# Patient Record
Sex: FEMALE | Race: BLACK OR AFRICAN AMERICAN
[De-identification: names, ages, dates, MRNs, and addresses within clinical notes are randomized per-mention and may not be internally consistent; named-entity substitution may affect disease eponyms.]

---

## 2017-09-27 ENCOUNTER — HOSPITAL ENCOUNTER (OUTPATIENT)
Dept: HOSPITAL 62 - WI | Age: 37
End: 2017-09-27
Attending: ADVANCED PRACTICE MIDWIFE
Payer: MEDICAID

## 2017-09-27 DIAGNOSIS — Z80.3: ICD-10-CM

## 2017-09-27 DIAGNOSIS — Z12.31: Primary | ICD-10-CM

## 2017-09-27 PROCEDURE — 77067 SCR MAMMO BI INCL CAD: CPT

## 2017-09-27 PROCEDURE — G0202 SCR MAMMO BI INCL CAD: HCPCS

## 2017-09-27 NOTE — WOMENS IMAGING REPORT
EXAM DESCRIPTION:  BILAT SCREENING MAMMO W/CAD



COMPLETED DATE/TIME:  9/27/2017 10:47 am



REASON FOR STUDY:  SCREENING MAMMO Z12.31  ENCNTR SCREEN MAMMOGRAM FOR MALIGNANT NEOPLASM OF FELICITAS



COMPARISON:  Multiple since 2011



TECHNIQUE:  Standard craniocaudal and mediolateral oblique views of each breast recorded using digita
l acquisition.



LIMITATIONS:  None.



FINDINGS:  No masses, calcifications or architectural distortion. No areas of suspicion.

Read with the assistance of CAD.

.[facility] - R2 Cenova Version 1.3

.[facility] - R2 Cenova Version 1.3

.[facility] - R2 Cenova Version 2.4

.[facility] [place]2 Cenova Version 2.4

.[facility] - R2  Version 9.2



IMPRESSION:  NORMAL MAMMOGRAM.  BIRADS 1.



BREAST DENSITY:  c. The breasts are heterogeneously dense, which may obscure small masses.



BIRAD:  1 NEGATIVE



RECOMMENDATION:  ROUTINE SCREENING



COMMENT:  The patient has been notified of the results by letter per SA requirements. Additional no
tification policies are in place for contacting patient with suspicious or incomplete findings.

Quality ID #225: The American College of Radiology recommends an annual screening mammogram for women
 aged 40 years or over. This facility utilizes a reminder system to ensure that all patients receive 
reminder letters, and/or direct phone calls for appointments. This includes reminders for routine scr
eening mammograms, diagnostic mammograms, or other Breast Imaging Interventions when appropriate.  Th
is patient will be placed in the appropriate reminder system.

The American College of Radiology (ACR) has developed recommendations for screening MRI of the breast
s in certain patient populations, to be used in conjunction with mammography.  Breast MRI surveillanc
e may be appropriate for women with more than 20% lifetime risk of developing breast cancer  as deter
mined by genetic testing, significant family history of the disease, or history of mantle radiation f
or Hodgkins Disease.  ACR Practice Guidelines 2008.



TECHNICAL DOCUMENTATION:  FINDING NUMBER: (1)

ASSESSMENT: (1)

JOB ID:  [id number]

 2011 [employer]- All Rights Reserved

## 2018-12-17 ENCOUNTER — HOSPITAL ENCOUNTER (EMERGENCY)
Dept: HOSPITAL 62 - ER | Age: 38
Discharge: HOME | End: 2018-12-17
Payer: MEDICAID

## 2018-12-17 VITALS — DIASTOLIC BLOOD PRESSURE: 64 MMHG | SYSTOLIC BLOOD PRESSURE: 123 MMHG

## 2018-12-17 DIAGNOSIS — M77.31: Primary | ICD-10-CM

## 2018-12-17 DIAGNOSIS — J45.909: ICD-10-CM

## 2018-12-17 DIAGNOSIS — Z87.891: ICD-10-CM

## 2018-12-17 DIAGNOSIS — M79.671: ICD-10-CM

## 2018-12-17 PROCEDURE — 99283 EMERGENCY DEPT VISIT LOW MDM: CPT

## 2018-12-17 NOTE — ER DOCUMENT REPORT
ED Extremity Problem, Lower





- General


Chief Complaint: Foot Pain


Stated Complaint: FOOT PAIN


Time Seen by Provider: 12/17/18 11:34


Mode of Arrival: Ambulatory


Information source: Patient


Notes: 





38-year-old female presented to ED for complaint of pain to her right foot with 

swelling.  She states she did a 5K March on Saturday and then had her daughter'

s birthday on Sunday and now is very painful to walk foot.  Patient states she 

has not had any injuries.  Patient states she always has some swelling to the 

right foot is just more swollen today than normal.  She states it is been very 

painful to walk or step on this foot.  Patient is alert and oriented 

respirations regular and unlabored speaking in full sentences.


TRAVEL OUTSIDE OF THE [place] IN LAST 30 DAYS: No





- HPI


Patient complains to provider of: Pain, Swelling


Location: Foot


Occurred: Yesterday - Right


Where: Other - See HPI


Onset/Duration: Gradual


Quality of pain: Sharp


Severity: Moderate


Pain Level: 4


Context: Other


Recent injury: No


Associated symptoms: Painful ambulation


Exacerbated by: Movement, Walking


Relieved by: Nothing





- Related Data


Allergies/Adverse Reactions: 


 





No Known Allergies Allergy (Verified 12/17/18 11:19)


 











Past Medical History





- General


Information source: Patient





- Social History


Smoking Status: Former Smoker


Chew tobacco use (# tins/day): No


Frequency of alcohol use: Social - 2-3 times a week


Drug Abuse: None


Lives with: Family


Family History: DM, Hyperlipidemia, Hypertension, Malignancy, Thyroid 

Disfunction, Other - cushings, pituitary tumor.


Patient has suicidal ideation: No


Patient has homicidal ideation: No





- Past Medical History


Cardiac Medical History: Reports: None


Pulmonary Medical History: Reports: Hx Asthma


EENT Medical History: Reports: None


Neurological Medical History: Reports: None


Endocrine Medical History: Reports: Hx Hypothyroidism


Renal/ Medical History: Reports: None


Malignancy Medical History: Reports: None


GI Medical History: Reports: None


Musculoskeletal Medical History: Reports Hx Arthritis, Reports Hx 

Musculoskeletal Deformity


Skin Medical History: Reports Hx Cellulitis - breast abscess


Psychiatric Medical History: Reports: None


Traumatic Medical History: Reports: None


Infectious Medical History: Reports: None


Past Surgical History: Reports: Hx Cholecystectomy, Hx Orthopedic Surgery - R 

hip replacement bilaterally, Hx Thyroid Surgery - thyroidectomy





- Immunizations


Immunizations up to date: No


Hx Diphtheria, Pertussis, Tetanus Vaccination: No





Review of Systems





- Review of Systems


Constitutional: No symptoms reported


EENT: No symptoms reported


Cardiovascular: No symptoms reported


Respiratory: No symptoms reported


Gastrointestinal: No symptoms reported


Genitourinary: No symptoms reported


Female Genitourinary: No symptoms reported


Musculoskeletal: Other - Painful swollen right foot ran a 5K on Saturday, stood 

all day yesterday for a birthday party, history of swollen foot, much worse 

today


Skin: No symptoms reported


Hematologic/Lymphatic: No symptoms reported


Neurological/Psychological: No symptoms reported


-: Yes All other systems reviewed and negative





Physical Exam





- Vital signs


Vitals: 


 











Temp Pulse Resp BP Pulse Ox


 


 98.5 F   93   16   123/64   98 


 


 12/17/18 11:27  12/17/18 11:27  12/17/18 11:27  12/17/18 11:27  12/17/18 11:27











Interpretation: Normal





- General


General appearance: Appears well, Alert





- HEENT


Head: Normocephalic, Atraumatic


Eyes: Normal


Pupils: PERRL





- Respiratory


Respiratory status: No respiratory distress


Chest status: Nontender


Breath sounds: Normal


Chest palpation: Normal





- Cardiovascular


Rhythm: Regular


Heart sounds: Normal auscultation


Murmur: No





- Abdominal


Inspection: Normal


Distension: No distension


Bowel sounds: Normal


Tenderness: Nontender


Organomegaly: No organomegaly





- Back


Back: Normal, Nontender





- Extremities


General upper extremity: Normal inspection, Nontender, Normal color, Normal ROM

, Normal temperature


General lower extremity: Normal color, Normal ROM, Normal temperature.  No: 

Jamil's sign


Foot: Tender, Edema, Metatarsal compress. pain, No evidence of FB





- Neurological


Neuro grossly intact: Yes


Cognition: Normal


Orientation: AAOx4


[place] Coma Scale Eye Opening: Spontaneous


[place] Coma Scale Verbal: Oriented


[place] Coma Scale Motor: Obeys Commands


[place] Coma Scale Total: 15


Speech: Normal


Motor strength normal: LUE, RUE, LLE, RLE


Sensory: Normal





- Psychological


Associated symptoms: Normal affect, Normal mood





- Skin


Skin Temperature: Warm


Skin Moisture: Dry


Skin Color: Normal





Course





- Re-evaluation


Re-evalutation: 





12/17/18 20:46


Discussed x-ray with patient and written report of x-ray given to patient for 

follow-up with primary doctor.  Patient was offered crutches and provided with 

crutches for her pain.  Patient was discharged home with instructions to follow-

up with primary doctor and with podiatry.  Patient was discharged home.  

Patient does have heel spurs.  Patient was given instructions on elevation ice 

and foot exercises.





- Vital Signs


Vital signs: 


 











Temp Pulse Resp BP Pulse Ox


 


 98.5 F   93   16   123/64   98 


 


 12/17/18 11:27  12/17/18 11:27  12/17/18 11:27  12/17/18 11:27  12/17/18 11:27














- Diagnostic Test


Radiology reviewed: Image reviewed, Reports reviewed





Procedures





- Immobilization


  ** Right Foot


Time completed: 13:20


Pre-Proc Neuro Vasc Exam: Normal


Immobilizer type: Crutches


Performed by: RN


Post-Proc Neuro Vasc Exam: Normal


Alignment checked and good: Yes





Discharge





- Discharge


Clinical Impression: 


Heel spur


Qualifiers:


 Laterality: right Qualified Code(s): M77.31 - Calcaneal spur, right foot





Condition: Stable


Disposition: HOME, SELF-CARE


Instructions:  Plantar Fasciitis or Heel Spur ([facility])


Additional Instructions: 


Plantar Fasciitis or Heel Spur





     Plantar fasciitis is an inflammation of a ligament on the underside of the 

foot.  It can be caused by injury, overuse such as running, or poorly fitting 

shoes.  There may be a bone spur on the heel if inflammation has persisted a 

long time.


     Plantar fasciitis is treated with stretching exercises and 

antiinflammatory medicine.  More severe cases may require injection of 

cortisone.  It may take several weeks to get better.  If nothing gives relief, 

an operation to remove the heel spur may help.


     Call or return if there is redness, increasing pain, swelling, fever, or 

any other new symptoms.





Exercises for the Foot Muscles





    Stretching and strengthening of the foot muscles is an important part of 

recovery from injury, as well as in treatment and prevention of overuse 

syndromes like plantar fasciitis.


     TOWEL CURLS:  Put your foot on a dry towel.  Curl your toes to pick it up, 

then drop it.  As it becomes easier, use a heavier towel. Repeat 20 times, 

twice daily.


     SHIN CURLS:  Lift and turn your knee, so your foot is against the opposite 

leg about mid-shin.  Try to "grab" the entire shin bone with your toes, while 

moving your foot up and down the leg for one minute. Repeat twice daily.


     TOE LIFTS:  Put your opposite foot over your 2nd to 5th toes.  Now lift 

the toes up, pushing the other foot upward.  Repeat 10 times, twice daily.  

Repeat using the large toe.


     EVERSIONS:  Cross the opposite foot over, placing the heel just behind the 

4th and 5th toes.  Try to lift up the outside of the bottom foot.  Hold 10 

seconds.  Repeat twice daily.








Ice & Elevation





     Apply ice packs frequently against the painful area.  Many different 

schedules are recommended, such as "20 minutes on, 20 minutes off" or "one hour 

ice, two hours rest."  If you need to work, you may need to go longer between 

ice treatments.  You should plan to have the area ice packed AT LEAST one-

fourth of the time.


     The ice should be applied over the wrap, tape, or splint, or over a layer 

of cloth -- not directly against the skin.  Some ice bags have a built-in cloth 

and can be put directly on the skin.


     Your injured part should be elevated as much as possible over the next 48 

hours.  Try to keep the injury above the level of the heart. Avoid use of the 

injured area.  Elevation and rest will decrease the swelling.





Ibuprofen





     Ibuprofen is an excellent, safe drug for pain control.  In addition, it 

has potent antiinflammatory effects which are beneficial, especially in the 

treatment of injuries, arthritis, or tendonitis. It's best to take ibuprofen 

with food.  Persons with ulcer disease or allergy to aspirin should notify 

their physician of this before taking ibuprofen.


     Take the medication exactly as prescribed.  Don't take additional doses 

unless instructed to do so by your doctor.  If you develop wheezing, shortness 

of breath, hives, faintness, stomach pain, vomiting, or dark black stools, 

return for re-evaluation at once.





FOLLOW-UP CARE:


If you have been referred to a physician for follow-up care, call the physician

s office for an appointment as you were instructed or within the next two days.

  If you experience worsening or a significant change in your symptoms, notify 

the physician immediately or return to the Emergency Department at any time for 

re-evaluation.


Prescriptions: 


Ibuprofen [Motrin 800 mg Tablet] 800 mg PO Q8HP PRN #30 tab


 PRN Reason: 


Forms:  Return to Work


Referrals: 


MARGARET HENRY DPM [ACTIVE STAFF] - Follow up as needed

## 2018-12-17 NOTE — RADIOLOGY REPORT (SQ)
EXAM DESCRIPTION:  FOOT RIGHT COMPLETE



COMPLETED DATE/TIME:  12/17/2018 12:04 pm



REASON FOR STUDY:  pain swelling



COMPARISON:  None.



NUMBER OF VIEWS:  Three views.



TECHNIQUE:  AP, lateral and oblique  radiographic images acquired of the right foot.



LIMITATIONS:  None.



FINDINGS:  MINERALIZATION: Normal.

BONES: No acute fracture or dislocation.  No worrisome bone lesions.

JOINTS: Talonavicular dorsal osteophytes.

SOFT TISSUES: No soft tissue swelling.  No foreign body.

OTHER: No other significant finding.



IMPRESSION:  No acute findings.  Talonavicular dorsal osteophytes.



TECHNICAL DOCUMENTATION:  JOB ID:  [id number]

 2011 [employer]- All Rights Reserved



Reading location - IP/workstation name: [email or web address]

## 2018-12-27 ENCOUNTER — HOSPITAL ENCOUNTER (OUTPATIENT)
Dept: HOSPITAL 62 - WI | Age: 38
End: 2018-12-27
Attending: ADVANCED PRACTICE MIDWIFE
Payer: MEDICAID

## 2018-12-27 DIAGNOSIS — Z12.31: Primary | ICD-10-CM

## 2018-12-27 DIAGNOSIS — Z80.3: ICD-10-CM

## 2018-12-27 PROCEDURE — 77063 BREAST TOMOSYNTHESIS BI: CPT

## 2018-12-27 PROCEDURE — 77067 SCR MAMMO BI INCL CAD: CPT

## 2018-12-27 NOTE — WOMENS IMAGING REPORT
EXAM DESCRIPTION:  3D SCREENING MAMMO BILAT



COMPLETED DATE/TIME:  12/27/2018 11:27 am



REASON FOR STUDY:  SCREENING MAMMO Z12.31  ENCNTR SCREEN MAMMOGRAM FOR MALIGNANT NEOPLASM OF FELICITAS



COMPARISON:  [id number]



TECHNIQUE:  Standard craniocaudal and mediolateral oblique views of each breast recorded using digita
l acquisition and breast tomosynthesis.



LIMITATIONS:  None.



FINDINGS:  No masses, calcifications or architectural distortion. No areas of suspicion.

Read with the assistance of CAD.

.[facility] - R2 Cenova Version 1.3

.[facility] - R2 Cenova Version 1.3

.[facility] - R2 Cenova Version 2.4

.[facility] [place] R2 Cenova Version 2.4

.[facility] - R2  Version 9.2



IMPRESSION:  NORMAL MAMMOGRAM.  BIRADS 1.



BREAST DENSITY:  b. There are scattered areas of fibroglandular density.



BIRAD:  1 NEGATIVE



RECOMMENDATION:  ROUTINE SCREENING



COMMENT:  The patient has been notified of the results by letter per SA requirements. Additional no
tification policies are in place for contacting patient with suspicious or incomplete findings.

Quality ID #225: The American College of Radiology recommends an annual screening mammogram for women
 aged 40 years or over. This facility utilizes a reminder system to ensure that all patients receive 
reminder letters, and/or direct phone calls for appointments. This includes reminders for routine scr
eening mammograms, diagnostic mammograms, or other Breast Imaging Interventions when appropriate.  Th
is patient will be placed in the appropriate reminder system.

The American College of Radiology (ACR) has developed recommendations for screening MRI of the breast
s in certain patient populations, to be used in conjunction with mammography.  Breast MRI surveillanc
e may be appropriate for women with more than 20% lifetime risk of developing breast cancer  as deter
mined by genetic testing, significant family history of the disease, or history of mantle radiation f
or Hodgkins Disease.  ACR Practice Guidelines 2008.

[employer]



PQRS 6045F: Fluoroscopic imaging is not utilized for breast tomosynthesis.



TECHNICAL DOCUMENTATION:  FINDING NUMBER: (1)

ASSESSMENT:  (1)

JOB ID:  [id number]

 2011 [employer]- All Rights Reserved



Reading location - IP/workstation name: [facility]

## 2019-01-15 ENCOUNTER — HOSPITAL ENCOUNTER (OUTPATIENT)
Dept: HOSPITAL 62 - OD | Age: 39
End: 2019-01-15
Attending: FAMILY MEDICINE
Payer: MEDICAID

## 2019-01-15 DIAGNOSIS — E78.5: Primary | ICD-10-CM

## 2019-01-15 DIAGNOSIS — E53.8: ICD-10-CM

## 2019-01-15 DIAGNOSIS — E03.9: ICD-10-CM

## 2019-01-15 DIAGNOSIS — Z79.899: ICD-10-CM

## 2019-01-15 PROCEDURE — 82950 GLUCOSE TEST: CPT

## 2019-01-15 PROCEDURE — 36415 COLL VENOUS BLD VENIPUNCTURE: CPT

## 2019-01-15 PROCEDURE — 84443 ASSAY THYROID STIM HORMONE: CPT

## 2019-01-15 PROCEDURE — 82306 VITAMIN D 25 HYDROXY: CPT

## 2019-01-15 PROCEDURE — 82947 ASSAY GLUCOSE BLOOD QUANT: CPT

## 2019-07-26 ENCOUNTER — HOSPITAL ENCOUNTER (EMERGENCY)
Dept: HOSPITAL 62 - ER | Age: 39
Discharge: HOME | End: 2019-07-26
Payer: MEDICAID

## 2019-07-26 VITALS — SYSTOLIC BLOOD PRESSURE: 130 MMHG | DIASTOLIC BLOOD PRESSURE: 82 MMHG

## 2019-07-26 DIAGNOSIS — R11.2: ICD-10-CM

## 2019-07-26 DIAGNOSIS — R10.10: ICD-10-CM

## 2019-07-26 DIAGNOSIS — K59.00: Primary | ICD-10-CM

## 2019-07-26 LAB
ADD MANUAL DIFF: NO
ALBUMIN SERPL-MCNC: 4.8 G/DL (ref 3.5–5)
ALP SERPL-CCNC: 65 U/L (ref 38–126)
ALT SERPL-CCNC: 29 U/L (ref 9–52)
ANION GAP SERPL CALC-SCNC: 11 MMOL/L (ref 5–19)
APPEARANCE UR: (no result)
APTT PPP: YELLOW S
AST SERPL-CCNC: 27 U/L (ref 14–36)
BASOPHILS # BLD AUTO: 0 10^3/UL (ref 0–0.2)
BASOPHILS NFR BLD AUTO: 0.5 % (ref 0–2)
BILIRUB DIRECT SERPL-MCNC: 0.2 MG/DL (ref 0–0.4)
BILIRUB SERPL-MCNC: 0.6 MG/DL (ref 0.2–1.3)
BILIRUB UR QL STRIP: NEGATIVE
BUN SERPL-MCNC: 9 MG/DL (ref 7–20)
CALCIUM: 9.5 MG/DL (ref 8.4–10.2)
CHLORIDE SERPL-SCNC: 101 MMOL/L (ref 98–107)
CO2 SERPL-SCNC: 27 MMOL/L (ref 22–30)
EOSINOPHIL # BLD AUTO: 0.1 10^3/UL (ref 0–0.6)
EOSINOPHIL NFR BLD AUTO: 2.3 % (ref 0–6)
ERYTHROCYTE [DISTWIDTH] IN BLOOD BY AUTOMATED COUNT: 14 % (ref 11.5–14)
GLUCOSE SERPL-MCNC: 99 MG/DL (ref 75–110)
GLUCOSE UR STRIP-MCNC: NEGATIVE MG/DL
HCT VFR BLD CALC: 43.2 % (ref 36–47)
HGB BLD-MCNC: 14.4 G/DL (ref 12–15.5)
KETONES UR STRIP-MCNC: 80 MG/DL
LYMPHOCYTES # BLD AUTO: 1.2 10^3/UL (ref 0.5–4.7)
LYMPHOCYTES NFR BLD AUTO: 20.1 % (ref 13–45)
MCH RBC QN AUTO: 31 PG (ref 27–33.4)
MCHC RBC AUTO-ENTMCNC: 33.3 G/DL (ref 32–36)
MCV RBC AUTO: 93 FL (ref 80–97)
MONOCYTES # BLD AUTO: 0.5 10^3/UL (ref 0.1–1.4)
MONOCYTES NFR BLD AUTO: 8.7 % (ref 3–13)
NEUTROPHILS # BLD AUTO: 4.2 10^3/UL (ref 1.7–8.2)
NEUTS SEG NFR BLD AUTO: 68.4 % (ref 42–78)
NITRITE UR QL STRIP: NEGATIVE
PH UR STRIP: 6 [PH] (ref 5–9)
PLATELET # BLD: 352 10^3/UL (ref 150–450)
POTASSIUM SERPL-SCNC: 3.9 MMOL/L (ref 3.6–5)
PROT SERPL-MCNC: 8.3 G/DL (ref 6.3–8.2)
PROT UR STRIP-MCNC: NEGATIVE MG/DL
RBC # BLD AUTO: 4.64 10^6/UL (ref 3.72–5.28)
SP GR UR STRIP: 1.03
TOTAL CELLS COUNTED % (AUTO): 100 %
UROBILINOGEN UR-MCNC: NEGATIVE MG/DL (ref ?–2)
WBC # BLD AUTO: 6.2 10^3/UL (ref 4–10.5)

## 2019-07-26 PROCEDURE — 74022 RADEX COMPL AQT ABD SERIES: CPT

## 2019-07-26 PROCEDURE — 85025 COMPLETE CBC W/AUTO DIFF WBC: CPT

## 2019-07-26 PROCEDURE — 74177 CT ABD & PELVIS W/CONTRAST: CPT

## 2019-07-26 PROCEDURE — 99284 EMERGENCY DEPT VISIT MOD MDM: CPT

## 2019-07-26 PROCEDURE — 80053 COMPREHEN METABOLIC PANEL: CPT

## 2019-07-26 PROCEDURE — 83690 ASSAY OF LIPASE: CPT

## 2019-07-26 PROCEDURE — 81001 URINALYSIS AUTO W/SCOPE: CPT

## 2019-07-26 PROCEDURE — 96361 HYDRATE IV INFUSION ADD-ON: CPT

## 2019-07-26 PROCEDURE — 84702 CHORIONIC GONADOTROPIN TEST: CPT

## 2019-07-26 PROCEDURE — 36415 COLL VENOUS BLD VENIPUNCTURE: CPT

## 2019-07-26 PROCEDURE — 96374 THER/PROPH/DIAG INJ IV PUSH: CPT

## 2019-07-26 PROCEDURE — 96375 TX/PRO/DX INJ NEW DRUG ADDON: CPT

## 2019-07-26 NOTE — ER DOCUMENT REPORT
ED Medical Screen (RME)





- General


Chief Complaint: Abdominal Pain


Stated Complaint: ABDOMINAL PAIN


Time Seen by Provider: 07/26/19 11:34


Primary Care Provider: 


ARPAN HUMPHRIES MD [Primary Care Provider] - Follow up as needed


Notes: 





Patient is a 31-year-old female presents to the emergency department for 

epigastric abdominal pain and a change in her bowel habits.  Patient states she 

has not had a "good" bowel movements in the last 4 days.  States she did try 

Gas-X which did not help at all.  Patient states she also used a fleets enema 

yesterday morning.  States "only a couple little balls came out."


Patient states she recently underwent an endoscopy and was diagnosed with a 

hiatal hernia.


Patient states the pain she has in her epigastric region today is different than

the pain she had prior to the endoscopy.


Patient has a history of a cholecystectomy.





GENERAL: Alert, interacts well. No acute distress.


ABDOMEN: Soft, generalized epigastric abdominal pain non-distended. Bowel sounds

present in all 4 quadrants.








I have greeted and performed a rapid initial assessment of this patient.  A 

comprehensive ED assessment and evaluation of the patient, analysis of test 

results and completion of the medical decision making process will be conducted 

by additional ED providers.





I have specifically instructed the patient or family members with the patient to

immediately return to any nursing staff should anything change in the patient's 

condition or with their chief complaint.





This medical record was dictated with voice recognizing software.  There may be 

grammatical, syntax errors that are unintended.


TRAVEL OUTSIDE OF THE [place] IN LAST 30 DAYS: No





- Related Data


Allergies/Adverse Reactions: 


                                        





No Known Allergies Allergy (Verified 07/26/19 10:59)


   











Past Medical History


Pulmonary Medical History: Reports: Hx Asthma


   Denies: Hx Tuberculosis


Endocrine Medical History: Reports: Hx Hypothyroidism


Renal/ Medical History: Denies: Hx Peritoneal Dialysis


Musculoskeltal Medical History: Reports Hx Arthritis, Reports Hx Musculoskeletal

Deformity


Skin Medical History: Reports Hx Cellulitis - breast abscess


Past Surgical History: Reports: Hx Cholecystectomy, Hx Orthopedic Surgery - R 

hip replacement bilaterally, Hx Thyroid Surgery - thyroidectomy.  Denies: Hx 

Pacemaker





- Immunizations


Immunizations up to date: No


Hx Diphtheria, Pertussis, Tetanus Vaccination: No





Physical Exam





- Vital signs


Vitals: 





                                        











Temp Pulse Resp BP Pulse Ox


 


 97.9 F   75   18   146/96 H  100 


 


 07/26/19 11:00  07/26/19 11:00  07/26/19 11:00  07/26/19 11:00  07/26/19 11:00














Course





- Vital Signs


Vital signs: 





                                        











Temp Pulse Resp BP Pulse Ox


 


 97.9 F   75   18   146/96 H  100 


 


 07/26/19 11:00  07/26/19 11:00  07/26/19 11:00  07/26/19 11:00  07/26/19 11:00














Doctor's Discharge





- Discharge


Referrals: 


ARPAN HUMPHRIES MD [Primary Care Provider] - Follow up as needed

## 2019-07-26 NOTE — RADIOLOGY REPORT (SQ)
EXAM DESCRIPTION:  ACUTE ABDOMEN SERIES



COMPLETED DATE/TIME:  7/26/2019 1:48 pm



REASON FOR STUDY:  change in bowel habits



COMPARISON:  None.



NUMBER OF VIEWS:  Three views.



TECHNIQUE:  Frontal chest, supine abdomen and upright/decubitus abdomen radiographic images acquired.




LIMITATIONS:  None.



FINDINGS:  CHEST: Lungs clear of infiltrates.

FREE AIR: None. No abnormal gas collections.

BOWEL GAS PATTERN: Nonobstructive pattern. No dilated loops or air fluid levels.

CALCIFICATIONS: No suspicious calcifications.

HARDWARE: None in the abdomen.

SOFT TISSUES: No gross mass or suggestion of organomegaly.

BONES: No acute fracture.  No worrisome bone lesions.

OTHER: No other significant finding.



IMPRESSION:  NO RADIOGRAPHIC EVIDENCE FOR ACUTE ABDOMINAL DISEASE.



TECHNICAL DOCUMENTATION:  JOB ID:  [id number]

 2011 [employer]- All Rights Reserved



Reading location - IP/workstation name: [email or web address]

## 2019-07-26 NOTE — ER DOCUMENT REPORT
ED General





- General


Chief Complaint: Abdominal Pain


Stated Complaint: ABDOMINAL PAIN


Time Seen by Provider: 07/26/19 11:34


Primary Care Provider: 


ARPAN HUMPHRIES MD [Primary Care Provider] - Follow up in 3-5 days


JOSE F BA MD [ACTIVE STAFF] - Follow up in 1 week


TRAVEL OUTSIDE OF THE [place] IN LAST 30 DAYS: No





- HPI


Notes: 





39-year-old female to the emergency department with complaints of upper 

abdominal pain for the past 2 days that has gotten worse.  She states that she 

started to feel uncomfortable in the upper abdomen and thought maybe she had gas

so she took Gas-X.  She states that did not really help.  And then she began to 

experience nausea and vomiting last night.  She states that she has had very 

little bowel movement and gave herself an enema.  It was mildly productive with 

small rui of stool.  She states that she felt a little bit better yesterday 

afternoon but this morning she woke again with worsening upper abdominal pain, 

felt like her belly was distended, and more vomiting.  She states that she has 

had a little bit of some diarrhea as well but nothing productive for bowel 

movement.  She states she has a history of hiatal hernia but is never bothered 

her before her given her pain.  She has a history of an it may help her when she

we are cholecystectomy.  She has not passed gas since midnight today.





- Related Data


Allergies/Adverse Reactions: 


                                        





amoxicillin Allergy (Verified 07/26/19 11:44)


   











Past Medical History





- General


Information source: Patient





- Social History


Smoking Status: Never Smoker


Chew tobacco use (# tins/day): No


Frequency of alcohol use: Occasional


Drug Abuse: None


Family History: Reviewed & Not Pertinent, DM, Hyperlipidemia, Hypertension, 

Malignancy, Thyroid Disfunction, Other


Patient has suicidal ideation: No


Patient has homicidal ideation: No


Pulmonary Medical History: Reports: Hx Asthma


   Denies: Hx Tuberculosis


Endocrine Medical History: Reports: Hx Hypothyroidism


Renal/ Medical History: Denies: Hx Peritoneal Dialysis


Musculoskeletal Medical History: Reports Hx Arthritis, Reports Hx 

Musculoskeletal Deformity


Skin Medical History: Reports Hx Cellulitis - breast abscess


Past Surgical History: Reports: Hx Cholecystectomy, Hx Orthopedic Surgery - R 

hip replacement bilaterally, Hx Thyroid Surgery - thyroidectomy.  Denies: Hx 

Pacemaker





- Immunizations


Immunizations up to date: No


Hx Diphtheria, Pertussis, Tetanus Vaccination: No





Review of Systems





- Review of Systems


Constitutional: denies: Chills, Fever


EENT: No symptoms reported


Cardiovascular: denies: Chest pain, Dyspnea, Syncope, Dizziness, Lightheaded


Respiratory: denies: Cough, Short of breath


Gastrointestinal: Abdominal pain, Nausea, Vomiting, Constipation


Genitourinary: No symptoms reported.  denies: Frequency, Flank pain, Hematuria


Musculoskeletal: No symptoms reported


Skin: No symptoms reported


Neurological/Psychological: No symptoms reported


-: Yes All other systems reviewed and negative





Physical Exam





- Vital signs


Vitals: 


                                        











Temp Pulse Resp BP Pulse Ox


 


 97.9 F   75   18   146/96 H  100 


 


 07/26/19 11:00  07/26/19 11:00  07/26/19 11:00  07/26/19 11:00  07/26/19 11:00











Interpretation: Hypertensive





- General


General appearance: Appears well, Alert





- HEENT


Head: Normocephalic, Atraumatic


Eyes: Normal


Pupils: PERRL


Ears: Normal


External canal: Normal


Tympanic membrane: Normal


Sinus: Normal


Nasal: Normal


Mouth/Lips: Normal


Pharynx: Normal


Neck: Normal





- Respiratory


Respiratory status: No respiratory distress


Chest status: Nontender


Breath sounds: Normal


Chest palpation: Normal





- Cardiovascular


Rhythm: Regular


Heart sounds: Normal auscultation


Murmur: No





- Abdominal


Inspection: Morbidly Obese


Distension: Other - Possible upper abdomen distention but exam is limited due to

habitus.  No: Tympanitic, Fluid wave, Distended bladder


Bowel sounds: Hypoactive


Tenderness: Tender.  No: McBurney's point, Bautista's sign, Guarding, Rebound


Organomegaly: No organomegaly





- Back


Back: Normal.  No: CVA tenderness, Vertebra tenderness





- Neurological


Neuro grossly intact: Yes


Cognition: Normal


Orientation: AAOx4


[place] Coma Scale Eye Opening: Spontaneous


[place] Coma Scale Verbal: Oriented


[place] Coma Scale Motor: Obeys Commands


[place] Coma Scale Total: 15


Speech: Normal


Motor strength normal: LUE, RUE, LLE, RLE


Sensory: Normal





- Psychological


Associated symptoms: Normal affect, Normal mood





- Skin


Skin Temperature: Warm


Skin Moisture: Dry


Skin Color: Normal





Course





- Re-evaluation


Re-evalutation: 





Impression:  Epigastric abd pain, constipation, vomiting.  No vomiting since 

arrival and tolerated po challenge.  CT is reassuring.  Will have her follow 

with GI specialist closely. She has been urged to return if worse at all -- with

fevers, worsening abd pain, chest pain, SOB, or any other concerns.   Will send 

home with medicines for nausea, constipation.  Patient agrees with the plan.  





- Vital Signs


Vital signs: 


                                        











Temp Pulse Resp BP Pulse Ox


 


 97.9 F   73   18   130/82 H  100 


 


 07/26/19 11:00  07/26/19 18:37  07/26/19 18:37  07/26/19 18:37  07/26/19 18:37














- Laboratory


Result Diagrams: 


                                 07/26/19 12:18





                                 07/26/19 12:18


Laboratory results interpreted by me: 


                                        











  07/26/19 07/26/19





  12:18 12:18


 


Total Protein  8.3 H 


 


Urine Ketones   80 H


 


Urine Blood   LARGE H














- Diagnostic Test


Radiology reviewed: Image reviewed, Reports reviewed





Discharge





- Discharge


Clinical Impression: 


 Upper abdominal pain





Vomiting


Qualifiers:


 Vomiting type: unspecified Vomiting Intractability: non-intractable Nausea 

presence: with nausea Qualified Code(s): R11.2 - Nausea with vomiting, 

unspecified





Constipation


Qualifiers:


 Constipation type: unspecified constipation type Qualified Code(s): K59.00 - 

Constipation, unspecified





Condition: Stable


Disposition: HOME, SELF-CARE


Instructions:  Abdominal Pain ([facility]), Constipation ([facility])


Additional Instructions: 


PUSH FLUIDS.  TAKE MEDICINES AS PRESCRIBED.   RETURN IF ANY WORSENING PAIN, IN

TRACTABLE VOMITING, FEVERS, OR ANY OTHER CONCERNS.  FOLLOW UP WITH YOUR GI 

SPECIALIST, DR. RENNER, FOR FURTHER MANAGEMENT, OR YOU MAY SEE THE GI SPECIALIST 

PROVIDED TO YOU TODAY.   


Prescriptions: 


Magnesium Citrate [Citrate of Magnesia 296 ml Bottle] 296 ml PO ONCE PRN #1 bot

tle


 PRN Reason: 


Ondansetron [Zofran Odt 4 mg Tablet] 1 - 2 tab PO Q4H PRN #15 tab.rapdis


 PRN Reason: For Nausea/Vomiting


Polyethylene Glycol 3350 [Miralax Powder 17 gm/Packet] 1 packet PO DAILY #1 

bottle


Referrals: 


ARPAN HUMPHRIES MD [Primary Care Provider] - Follow up in 3-5 days


JOSE F BA MD [ACTIVE STAFF] - Follow up in 1 week

## 2019-07-26 NOTE — RADIOLOGY REPORT (SQ)
EXAM DESCRIPTION:  CT ABD/PELVIS WITH IV   ORAL



COMPLETED DATE/TIME:  7/26/2019 5:07 pm



REASON FOR STUDY:  abdominal pain, constipation



COMPARISON:  None.



TECHNIQUE:  CT scan of the abdomen and pelvis performed using helical scanning technique with dynamic
 intravenous contrast injection.  With oral contrast. Images reviewed with lung, soft tissue, and bon
e windows. Reconstructed coronal and sagittal MPR images reviewed. Delayed images for evaluation of t
he urinary system also acquired. All images stored on PACS.

All CT scanners at this facility use dose modulation, iterative reconstruction, and/or weight based d
osing when appropriate to reduce radiation dose to as low as reasonably achievable (ALARA).

[facility]: Dose Right  [facility]: CareDose    [facility]: Dose Right    [facility]: Teradose 4D    [facility]: Smart Technologies



CONTRAST TYPE AND DOSE:  contrast/concentration: Isovue 350.00 mg/ml; Total Contrast Delivered: 94.5 
ml; Total Saline Delivered: 44.0 ml



RENAL FUNCTION:  GFR > 60.



RADIATION DOSE:  CT Rad equipment meets quality standard of care and radiation dose reduction techniq
ues were employed. CTDIvol: 21.0 mGy. DLP: 1184 mGy-cm..



LIMITATIONS:  None.



FINDINGS:  LOWER CHEST: No significant findings. No nodules or infiltrates.

LIVER: Normal size. No masses.  No dilated ducts.

SPLEEN: Normal size. No focal lesions.

PANCREAS: No masses. No significant calcifications. No adjacent inflammation or peripancreatic fluid 
collections. Pancreatic duct not dilated.

GALLBLADDER: Surgically absent.

ADRENAL GLANDS: No significant masses or asymmetry.

RIGHT KIDNEY AND URETER: No solid masses.   No significant calcifications.   No hydronephrosis or hyd
roureter.

LEFT KIDNEY AND URETER: No solid masses.   No significant calcifications.   No hydronephrosis or hydr
oureter.

AORTA AND VESSELS: No aneurysm. No dissection. Renal arteries, SMA, celiac without stenosis.

RETROPERITONEUM: No retroperitoneal adenopathy, hemorrhage or masses.

BOWEL AND PERITONEAL CAVITY: No masses or inflammatory changes. No free fluid or peritoneal masses.

APPENDIX: Normal.

PELVIS: Left ovary appears enlarged at 5 cm.  No definitive masses.

ABDOMINAL WALL: No masses. No hernias.

BONES: No significant or acute findings.

OTHER: No other significant finding.



IMPRESSION:  Enlarged left ovary.  No definitive discrete ovarian mass.

Otherwise no acute or significant finding otherwise.



TECHNICAL DOCUMENTATION:  JOB ID:  [id number]

Quality ID # 436: Final reports with documentation of one or more dose reduction techniques (e.g., Au
tomated exposure control, adjustment of the mA and/or kV according to patient size, use of iterative 
reconstruction technique)

 2011 "[employer]- All Rights Reserved



Reading location - IP/workstation name: [email or web address]

## 2020-01-02 ENCOUNTER — HOSPITAL ENCOUNTER (OUTPATIENT)
Dept: HOSPITAL 62 - OD | Age: 40
End: 2020-01-02
Attending: FAMILY MEDICINE
Payer: MEDICAID

## 2020-01-02 DIAGNOSIS — E53.8: ICD-10-CM

## 2020-01-02 DIAGNOSIS — J30.2: Primary | ICD-10-CM

## 2020-01-02 PROCEDURE — 82607 VITAMIN B-12: CPT

## 2020-01-02 PROCEDURE — 36415 COLL VENOUS BLD VENIPUNCTURE: CPT

## 2020-01-21 ENCOUNTER — HOSPITAL ENCOUNTER (OUTPATIENT)
Dept: HOSPITAL 62 - WI | Age: 40
End: 2020-01-21
Attending: ADVANCED PRACTICE MIDWIFE
Payer: MEDICAID

## 2020-01-21 DIAGNOSIS — Z12.31: Primary | ICD-10-CM

## 2020-01-21 PROCEDURE — 77063 BREAST TOMOSYNTHESIS BI: CPT

## 2020-01-21 PROCEDURE — 77067 SCR MAMMO BI INCL CAD: CPT

## 2020-01-21 NOTE — WOMENS IMAGING REPORT
EXAM DESCRIPTION:  3D SCREENING MAMMO BILAT



COMPLETED DATE/TIME:  1/21/2020 12:02 pm



REASON FOR STUDY:  Z12.31 SCREENING MAMMO Z12.31  ENCNTR SCREEN MAMMOGRAM FOR MALIGNANT NEOPLASM OF B
RE



COMPARISON:  12/27/2018 and 9/27/2017.



EXAM PARAMETERS:  Views: Standard craniocaudal and mediolateral oblique views of each breast recorded
 using digital acquisition and breast tomosynthesis.

Read with the assistance of CAD.

.[facility] - R2  Version 9.2



LIMITATIONS:  None.



FINDINGS:  No suspicious masses, suspicious calcifications or architectural distortion. No areas of c
oncern.



IMPRESSION:   NEGATIVE MAMMOGRAM. BIRADS 1.



BREAST DENSITY:  b. There are scattered areas of fibroglandular density.



BIRAD:  ASSESSMENT:  1 NEGATIVE



RECOMMENDATION:  ROUTINE SCREENING



COMMENT:  The patient has been notified of the results by letter per MQSA requirements. Additional no
tification policies are in place for contacting patient with suspicious or incomplete findings.

Quality ID #225: The American College of Radiology recommends an annual screening mammogram for women
 aged 40 years or over. This facility utilizes a reminder system to ensure that all patients receive 
reminder letters, and/or direct phone calls for appointments. This includes reminders for routine scr
eening mammograms, diagnostic mammograms, or other Breast Imaging Interventions when appropriate.  Th
is patient will be placed in the appropriate reminder system.



TECHNICAL DOCUMENTATION:  FINDING NUMBER: (1)

ASSESSMENT:  (1)

JOB ID:  [id number]

 2011 [employer]- All Rights Reserved



Reading location - IP/workstation name: [email or web address]

## 2020-06-26 NOTE — RADIOLOGY REPORT (SQ)
EXAM DESCRIPTION:  CT ABD/PELVIS NO ORAL OR IV



IMAGES COMPLETED DATE/TIME:  6/26/2020 7:35 pm



REASON FOR STUDY:  right flank pain



COMPARISON:  7/26/2019



TECHNIQUE:  CT scan of the abdomen and pelvis performed without intravenous or oral contrast. Images 
reviewed with lung, soft tissue, and bone windows. Reconstructed coronal and sagittal MPR images revi
ewed. All images stored on PACS.

All CT scanners at this facility use dose modulation, iterative reconstruction, and/or weight based d
osing when appropriate to reduce radiation dose to as low as reasonably achievable (ALARA).

[facility]: Dose Right  [facility]: CareDose    [facility]: Dose Right    [facility]: Teradose 4D    [facility]: Smart CareShare



RADIATION DOSE:  CT Rad equipment meets quality standard of care and radiation dose reduction techniq
ues were employed. CTDIvol: 15.9 mGy. DLP: 868 mGy-cm.mGy.



LIMITATIONS:  Artifact from right hip hardware slightly limits evaluation of the pelvis.



FINDINGS:  LOWER CHEST: No significant findings. No nodules or infiltrates.

NON-CONTRASTED LIVER, SPLEEN, ADRENALS: Evaluation limited by lack of IV contrast. No identified sign
ificant masses.

PANCREAS: No masses. No peripancreatic inflammatory changes.

GALLBLADDER: Surgically absent.

RIGHT KIDNEY AND URETER: No suspicious masses. Assessment limited by lack of IV contrast.   No signif
icant calcifications.   No hydronephrosis or hydroureter.

LEFT KIDNEY AND URETER: No suspicious masses. Assessment limited by lack of IV contrast.   No signifi
cant calcifications.   No hydronephrosis or hydroureter.

AORTA AND RETROPERITONEUM: No aneurysm. No retroperitoneal masses or adenopathy.

BOWEL AND PERITONEAL CAVITY: No obvious masses or inflammatory changes. No free fluid.

APPENDIX: Not identified.

PELVIS, BLADDER, AND ABDOMINAL WALL:Slightly limited.  No abnormal masses.  Bladder appears normal.

BONES: No significant findings.

OTHER: No other significant finding.



IMPRESSION:  NO SIGNIFICANT OR ACUTE PROCESS IN THE ABDOMEN OR PELVIS.



COMMENT:  Quality ID # 436: Final reports with documentation of one or more dose reduction techniques
 (e.g., Automated exposure control, adjustment of the mA and/or kV according to patient size, use of 
iterative reconstruction technique)



TECHNICAL DOCUMENTATION:  JOB ID:  [id number]

 2011 [employer]- All Rights Reserved



Reading location - IP/workstation name: [email or web address]

## 2020-06-26 NOTE — ER DOCUMENT REPORT
ED Medical Screen (RME)





- General


Stated Complaint: KIDNEY PAIN


Time Seen by Provider: 06/26/20 19:02


Primary Care Provider: 


TORIBIO RAI CNM [Primary Care Provider] - Follow up as needed


Notes: 





Patient is a 39-year-old female who presents emergency department with a chief 

complaint of right sided flank pain.  Patient states that her pain started at 

230 this morning.  Describes her pain as a sharp pain.  Patient has history of a

cholecystectomy and thyroidectomy in the past.





Exam: Right CVA tenderness.





I have greeted and performed a rapid initial assessment of this patient.  A 

comprehensive ED assessment and evaluation of the patient, analysis of test 

results and completion of medical decision making process will be conducted by 

an additional ED providers.


TRAVEL OUTSIDE OF THE [place] IN LAST 30 DAYS: No





- Related Data


Allergies/Adverse Reactions: 


                                        





amoxicillin Allergy (Verified 07/26/19 11:44)


   











Past Medical History


Pulmonary Medical History: Reports: Hx Asthma


   Denies: Hx Tuberculosis


Endocrine Medical History: Reports: Hx Hypothyroidism


Renal/ Medical History: Denies: Hx Peritoneal Dialysis


Musculoskeltal Medical History: Reports Hx Arthritis, Reports Hx Musculoskeletal

Deformity


Skin Medical History: Reports Hx Cellulitis - breast abscess


Past Surgical History: Reports: Hx Cholecystectomy, Hx Orthopedic Surgery - R 

hip replacement bilaterally, Hx Thyroid Surgery - thyroidectomy.  Denies: Hx 

Pacemaker





- Immunizations


Immunizations up to date: No


Hx Diphtheria, Pertussis, Tetanus Vaccination: No





Physical Exam





- Vital signs


Vitals: 





                                        











Temp Pulse Resp BP Pulse Ox


 


 97.6 F   86   20   113/82   98 


 


 06/26/20 16:58  06/26/20 16:58  06/26/20 16:58  06/26/20 16:58  06/26/20 16:58














Course





- Vital Signs


Vital signs: 





                                        











Temp Pulse Resp BP Pulse Ox


 


 97.6 F   86   20   113/82   98 


 


 06/26/20 16:58  06/26/20 16:58  06/26/20 16:58  06/26/20 16:58  06/26/20 16:58














- Laboratory


Result Diagrams: 


                                 06/26/20 17:30





                                 06/26/20 17:30


Laboratory results interpreted by me: 





                                        











  06/26/20 06/26/20





  17:30 17:30


 


Sodium  136.9 L 


 


Urine Ketones   TRACE H


 


Urine Blood   SMALL H


 


Ur Leukocyte Esterase   TRACE H














Doctor's Discharge





- Discharge


Referrals: 


RAI,TORIBIO, CNM [Primary Care Provider] - Follow up as needed

## 2020-06-27 NOTE — ER DOCUMENT REPORT
ED GI/





- General


Chief Complaint: Flank Pain


Stated Complaint: KIDNEY PAIN


Time Seen by Provider: 06/26/20 19:02


Primary Care Provider: 


[facility] [Provider Group] - Follow up as needed


TORIBIO RAI CNM [CERTIFIED NURSE MIDWIFE] - Follow up as needed


Mode of Arrival: Ambulatory


Information source: Patient


Notes: 





39-year-old female presented to ED for complaint of right flank pain.  She 

states it started about 2:30 in the morning.  She states that pain was sharp and

woke her up.  States the pain was sharp and woke her up.  She is alert oriented 

respirations regular nonlabored speaking in full sentences.


TRAVEL OUTSIDE OF THE [place] IN LAST 30 DAYS: No





- HPI


Patient complains to provider of: Flank pain - Right flank


Onset: This morning


Timing/Duration: Sudden


Quality of pain: Sharp


Severity at maximum: Moderate


Severity in ED: Moderate


Location: Right flank


Vaginal bleeding (Compared to normal period): None


Associated symptoms: Other


Exacerbated by: Movement - Right flank pain


Relieved by: Denies


Similar symptoms previously: No


Recently seen / treated by doctor: No





- Related Data


Allergies/Adverse Reactions: 


                                        





amoxicillin Allergy (Verified 07/26/19 11:44)


   











Past Medical History





- General


Information source: Patient





- Social History


Smoking Status: Never Smoker


Frequency of alcohol use: None


Drug Abuse: None


Lives with: Family


Family History: Reviewed & Not Pertinent, DM, Hyperlipidemia, Hypertension, 

Malignancy, Thyroid Disfunction, Other





- Past Medical History


Cardiac Medical History: Reports: None


Pulmonary Medical History: Reports: Hx Asthma


EENT Medical History: Reports: None


Neurological Medical History: Reports: None


Endocrine Medical History: Reports: Hx Hypothyroidism


Renal/ Medical History: Reports: None


Malignancy Medical History: Reports: None


GI Medical History: Reports: None


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

l Deformity


Skin Medical History: Reports Hx Cellulitis - breast abscess


Psychiatric Medical History: Reports: None


Traumatic Medical History: Reports: None


Infectious Medical History: Reports: None


Past Surgical History: Reports: Hx Cholecystectomy, Hx Orthopedic Surgery - R 

hip replacement bilaterally, Hx Thyroid Surgery - thyroidectomy





- Immunizations


Immunizations up to date: No


Hx Diphtheria, Pertussis, Tetanus Vaccination: No





Review of Systems





- Review of Systems


Constitutional: No symptoms reported


EENT: No symptoms reported


Cardiovascular: No symptoms reported


Respiratory: No symptoms reported


Gastrointestinal: No symptoms reported


Genitourinary: Flank pain


Female Genitourinary: No symptoms reported


Musculoskeletal: No symptoms reported


Skin: No symptoms reported


Hematologic/Lymphatic: No symptoms reported


Neurological/Psychological: No symptoms reported


-: Yes All other systems reviewed and negative





Physical Exam





- Vital signs


Vitals: 


                                        











Temp Pulse Resp BP Pulse Ox


 


 97.6 F   86   20   113/82   98 


 


 06/26/20 16:58  06/26/20 16:58  06/26/20 16:58  06/26/20 16:58  06/26/20 16:58











Interpretation: Normal





- General


General appearance: Appears well, Alert





- HEENT


Head: Normocephalic, Atraumatic


Eyes: Normal


Pupils: PERRL





- Respiratory


Respiratory status: No respiratory distress


Chest status: Nontender


Breath sounds: Normal


Chest palpation: Normal





- Cardiovascular


Rhythm: Regular


Heart sounds: Normal auscultation


Murmur: No





- Abdominal


Inspection: Normal


Distension: No distension


Bowel sounds: Normal


Tenderness: Nontender


Organomegaly: No organomegaly





- Back


Back: Normal, Tender, CVA tenderness - Right CVA.  No: Vertebra tenderness, 

Scars





- Extremities


General upper extremity: Normal inspection, Nontender, Normal color, Normal ROM,

Normal temperature


General lower extremity: Normal inspection, Nontender, Normal color, Normal ROM,

Normal temperature, Normal weight bearing.  No: Jamil's sign





- Neurological


Neuro grossly intact: Yes


Cognition: Normal


Orientation: AAOx4


[place] Coma Scale Eye Opening: Spontaneous


[place] Coma Scale Verbal: Oriented


[place] Coma Scale Motor: Obeys Commands


[place] Coma Scale Total: 15


Speech: Normal


Motor strength normal: LUE, RUE, LLE, RLE


Sensory: Normal





- Psychological


Associated symptoms: Normal affect, Normal mood





- Skin


Skin Temperature: Warm


Skin Moisture: Dry


Skin Color: Normal





Course





- Re-evaluation


Re-evalutation: 





06/27/20 07:46


Discussed labs and CT results with patient.  There is was no signs or symptoms 

of any abnormalities noted on the CT.  This was discussed with the patient and 

written report given to the patient.  Patient was also given written report of 

the labs.  Patient was not instructed to use anti-inflammatories ice packs warm 

packs and exercises and patient was discharged home.





- Vital Signs


Vital signs: 


                                        











Temp Pulse Resp BP Pulse Ox


 


 98.0 F   79   20   125/79   96 


 


 06/27/20 00:45  06/27/20 00:45  06/27/20 00:45  06/27/20 00:45  06/27/20 00:45














- Laboratory


Result Diagrams: 


                                 06/26/20 17:30





                                 06/26/20 17:30


Laboratory results interpreted by me: 


                                        











  06/26/20 06/26/20





  17:30 17:30


 


Sodium  136.9 L 


 


Urine Ketones   TRACE H


 


Urine Blood   SMALL H


 


Ur Leukocyte Esterase   TRACE H














- Diagnostic Test


Radiology reviewed: Image reviewed, Reports reviewed





Discharge





- Discharge


Clinical Impression: 


 Right flank pain





Condition: Stable


Disposition: HOME, SELF-CARE


Additional Instructions: 


Flank Pain





     We weren't able to prove an exact cause for your flank pain. Pain in the 

flank can be caused by a muscle strain or spasm. Sometimes a kidney stone causes

pain, but can't be found on our tests. Infection in the kidney should be evident

on a urine test. Early shingles can occasionally cause flank pain, without the 

rash that proves the diagnosis. On rare occasions, disease of the pancreas, 

aorta, spleen, or colon can create pain in the flank.


     At this time, there's no evidence of a dangerous condition, and it seems 

safe for you to be at home. If the pain goes away and does not come back, no 

further testing will be needed. If pain persists, or becomes more severe, we may

need to repeat some tests or order additional new testing.


     Blood in the urine, urgency to urinate frequently, and pain that radiates t

o the groin can indicate a kidney stone. Fever may mean that the pain is due to 

infection, either of the kidney or the colon (diverticulitis). If your pain is 

early shingles, you should develop an eruption of blisters in the painful area 

within a few days. 


     Call the doctor or return if you have pain that is spreading or becoming 

more severe, pain that does not resolve with time, fever, or any other new 

symptoms.








Toradol Injection





     You have been given an injection of ketorolac tromethamine (Toradol).  This

is an excellent, safe drug for pain control.  It also has potent antiinflamma

tory action.  You should have significant pain relief within about one hour.


     Toradol is not addicting and is non-sedating.  It does not interfere with 

driving or work.


     Call or return if you develop itching, hives, shortness of breath, or rash.








Ibuprofen





     Ibuprofen is an excellent, safe drug for pain control.  In addition, it has

potent antiinflammatory effects which are beneficial, especially in the 

treatment of injuries, arthritis, or tendonitis. It's best to take ibuprofen wit

h food.  Persons with ulcer disease or allergy to aspirin should notify their 

physician of this before taking ibuprofen.


     Take the medication exactly as prescribed.  Don't take additional doses un

less instructed to do so by your doctor.  If you develop wheezing, shortness of 

breath, hives, faintness, stomach pain, vomiting, or dark black stools, return 

for re-evaluation at once.





Muscle Relaxers





     Muscle relaxing medications are usually prescribed for acute muscle spasm 

or injury to the neck and back.  They are often combined with antiinflammatory 

pain medication for increased relief.


     You may stop the muscle relaxer when the pain and stiffness have improved. 

Start the medication again if spasms recur.  


     Muscle relaxers may cause drowsiness, especially with the first dose.  Do 

not operate machinery or drive while under the effects of the medication.  Most 

muscle relaxers last up to 24 hours.  Do not combine the medication with 

alcohol.





Ice Packs





     Apply ice packs frequently against the painful area.  Many different 

schedules are recommended, such as "20 minutes on, 20 minutes off" or "one hour 

ice, two hours rest."  If you need to work, you may need to go longer between 

ice treatments.  You should plan to have the area ice packed AT LEAST one fourth

of the time.


     The ice should be applied over the wrap, tape, or splint, or over a layer 

of cloth -- not directly against the skin.  Some ice bags have a built-in cloth 

and can be put directly on the skin.





Warm Packs





     After approximately two days, apply gentle heat (such as a heating pad or 

hot water bottle) for about 20 to 30 minutes about every two hours -- at least 

four times daily.  Warmth and elevation will help you make a more rapid 

recovery, and will ease the pain considerably.


     Do not use HOT heat, and never apply heat for longer than 30 minutes.  The 

continuous heat can invisibly damage skin and muscles -- even when no burn is 

seen on the surface.  Damaged muscles can make you MORE sore.








Stretching Exercises for the Back





     The physician has recommended that you begin stretching exercises for your 

back.  These are often used even while the back is painful. However, you should 

notify the physician if the activities seem to increase your pain.


     PELVIC TILT:  Lie flat on your back with knees bent.  Tighten your stomach 

and buttock muscles so it flattens your lower back against the floor.  Hold 10 

seconds.  Repeat 10 times, twice daily.


     KNEE RAISE:  Lying on the back with knees bent, raise one knee to your 

chest, then the other.  Hold both knees against the chest 10 seconds, then lower

one knee at a time.  Repeat 10 times, twice daily.


     PARTIAL TRUNK RAISE:  Lie face down, arms at your sides.  Keeping your 

waist on the floor, use your arms raise your chest up.  Support yourself on your

elbows for 30 seconds.  Repeat twice daily, increasing the time to two minutes 

as you recover.











FOLLOW-UP CARE:


If you have been referred to a physician for follow-up care, call the 

physicians office for an appointment as you were instructed or within the next 

two days.  If you experience worsening or a significant change in your symptoms,

notify the physician immediately or return to the Emergency Department at any 

time for re-evaluation.


Prescriptions: 


Cyclobenzaprine HCl [Flexeril 10 mg Tablet] 10 mg PO TIDP PRN #15 tab


 PRN Reason: 


Referrals: 


TORIBIO RAI CNM [CERTIFIED NURSE MIDWIFE] - Follow up as needed


[facility] [Provider Group] - Follow up as needed